# Patient Record
Sex: MALE | Race: OTHER | Employment: FULL TIME | URBAN - METROPOLITAN AREA
[De-identification: names, ages, dates, MRNs, and addresses within clinical notes are randomized per-mention and may not be internally consistent; named-entity substitution may affect disease eponyms.]

---

## 2023-01-16 ENCOUNTER — OFFICE VISIT (OUTPATIENT)
Dept: UROLOGY | Age: 28
End: 2023-01-16

## 2023-01-16 DIAGNOSIS — Z30.09 VASECTOMY EVALUATION: Primary | ICD-10-CM

## 2023-01-16 ASSESSMENT — ENCOUNTER SYMPTOMS
VOMITING: 0
NAUSEA: 0
COUGH: 0
HEARTBURN: 0
DIARRHEA: 0
WHEEZING: 0
ABDOMINAL PAIN: 0
BACK PAIN: 0
CONSTIPATION: 0
SHORTNESS OF BREATH: 0
SKIN LESIONS: 0
INDIGESTION: 0
EYE DISCHARGE: 0
EYE PAIN: 0
BLOOD IN STOOL: 0

## 2023-01-16 NOTE — PROGRESS NOTES
[facility]  [address] [place] [address] [address], [address] [facility]  [phone]    Jocelin Degroot  : 1995    Chief Complaint   Patient presents with    Sterilization     Vas consult          HPI     Jocelin Degroot is a 32 y.o. male who is referred to clinic for vasectomy. He is not  and does not want biological children. He wants vasectomy for elective sterilization. Denies any issues with testicular pain. NO bothersome LUTS. No history of  surgery. History reviewed. No pertinent past medical history. History reviewed. No pertinent surgical history. No current outpatient medications on file. No current facility-administered medications for this visit. No Known Allergies  Social History     Socioeconomic History    Marital status: Single     Spouse name: Not on file    Number of children: Not on file    Years of education: Not on file    Highest education level: Not on file   Occupational History    Not on file   Tobacco Use    Smoking status: Not on file    Smokeless tobacco: Not on file   Substance and Sexual Activity    Alcohol use: Not on file    Drug use: Not on file    Sexual activity: Not on file   Other Topics Concern    Not on file   Social History Narrative    Not on file     Social Determinants of Health     Financial Resource Strain: Not on file   Food Insecurity: Not on file   Transportation Needs: Not on file   Physical Activity: Not on file   Stress: Not on file   Social Connections: Not on file   Intimate Partner Violence: Not on file   Housing Stability: Not on file     History reviewed. No pertinent family history. Review of Systems  Constitutional:   Negative for fever, chills, appetite change, malaise/fatigue, headaches and weight loss. Skin:  Negative for skin lesions, rash and itching. Eyes:  Negative for visual disturbance, eye pain and eye discharge.   ENT:  Negative for difficulty articulating words, pain swallowing, high frequency hearing loss and dry mouth. Respiratory:  Negative for cough, blood in sputum, shortness of breath and wheezing. Cardiovascular:  Negative for chest pain, hypertension, irregular heartbeat, leg pain, leg swelling, regular rate and rhythm and varicose veins. GI:  Negative for nausea, vomiting, abdominal pain, blood in stool, constipation, diarrhea, indigestion and heartburn. Genitourinary:  Negative for urinary burning, hematuria, flank pain, recurrent UTIs, history of urolithiasis, nocturia, slower stream, straining, urgency, leakage w/ urge, frequent urination, incomplete emptying, erectile dysfunction, testicular pain, sexually transmitted disease, discharge and urethral stricture. Musculoskeletal:  Negative for back pain, bone pain, arthralgias, tenderness, muscle weakness and neck pain. Neurological:  Negative for dizziness, focal weakness, numbness, seizures and tremors. Psychological:  Negative for depression and psychiatric problem. Endocrine:  Negative for cold intolerance, thirst, excessive urination, fatigue and heat intolerance. Hem/Lymphatic:  Negative for easy bleeding, easy bruising and frequent infections. There were no vitals taken for this visit.      GENERAL: No acute distress, Awake, Alert, Oriented X 3, Gait normal  HEENT: Trachea midline, No gross visual or auditory impairments  CARDIAC: regular rate and rhythm  CHEST AND LUNG: Easy work of breathing, clear to auscultation bilaterally, no cyanosis  ABDOMEN: soft, non tender, non-distended, positive bowel sounds, no organomegaly, no palpable masses, no guarding, no rebound tenderness  : Circumcised phallus without abnormality, Testicles descended in scrotum bilaterally and without palpable mass/nodule, non-tender, no edema, no skin erythema, vas deferens palpable bilaterally, no hydrocele, no inguinal hernias, no inguinal lymphadenopathy  SKIN: No rash, no erythema, no lacerations or abrasions, no ecchymosis  NEUROLOGIC: cranial nerves 2-12 grossly intact         Assessment and Plan    ICD-10-CM    1. Vasectomy evaluation  Z30.09         I counseled the patient extensively on the vasectomy procedure today. He was provided with a handout detailing the operative procedure and I explained the procedure to him step by step. Risksb/benefits/alternatives to procedure were reviewed in detail. He understands that he should consider this to be a permanent sterilization procedure and that I recommend he not have this done unless he is 492% certain that he does not desire future children. We discussed that vasectomies can be reversed if he change his mind in the future, but that reversals are not 100% effective and require more extensive reconstruction in the OR with use of an operating microscope. He understands the risks of bleeding, infection, injury to surrounding structures, 1/2000 risk of recannulization of the vas deferens, chronic testicular pain, need for repeat or revision surgery, possible need to complete vasectomy in the operating room if we are unable to complete it in clinic, risks of local anesthesia, hematoma, sperm granuloma. We also reviewed the need to use another form of contraception for 3 months after vasectomy until he has completed at least 20 ejaculations and had a confirmed negative semen analysis in the Urology office. After our discussion, he expressed understanding of all of the above and indicated that he would like to proceed with scheduling a bilateral vasectomy. >50% of today's visit was spent counseling the patient on the above risks/benefits/alternatives to vasectomy. Nando Arguello M.D.     [facility]  [address]  Phone: [phone]  Fax: [phone]

## 2023-10-06 ENCOUNTER — OFFICE VISIT (OUTPATIENT)
Dept: UROLOGY | Age: 28
End: 2023-10-06

## 2023-10-06 VITALS — HEIGHT: 65 IN | BODY MASS INDEX: 21.66 KG/M2 | WEIGHT: 130 LBS

## 2023-10-06 DIAGNOSIS — Z30.2 ENCOUNTER FOR VASECTOMY: Primary | ICD-10-CM

## 2024-01-11 ENCOUNTER — OFFICE VISIT (OUTPATIENT)
Dept: UROLOGY | Age: 29
End: 2024-01-11

## 2024-01-11 DIAGNOSIS — Z98.52 S/P VASECTOMY: Primary | ICD-10-CM

## 2024-01-11 NOTE — PROGRESS NOTES
Semen check #1  Vasectomy by Dr. Agosto on 10/06/2023    No sperm on specimen today.  Cleared after vasectomy.  MA to notify patient    Nando Agosto M.D.    [facility]  [address] [address]  Phone: [phone]  Fax: [phone]